# Patient Record
Sex: MALE | Race: WHITE | NOT HISPANIC OR LATINO | Employment: FULL TIME | URBAN - METROPOLITAN AREA
[De-identification: names, ages, dates, MRNs, and addresses within clinical notes are randomized per-mention and may not be internally consistent; named-entity substitution may affect disease eponyms.]

---

## 2020-05-02 ENCOUNTER — HOSPITAL ENCOUNTER (EMERGENCY)
Facility: MEDICAL CENTER | Age: 60
End: 2020-05-02
Attending: EMERGENCY MEDICINE

## 2020-05-02 ENCOUNTER — APPOINTMENT (OUTPATIENT)
Dept: RADIOLOGY | Facility: MEDICAL CENTER | Age: 60
End: 2020-05-02
Attending: EMERGENCY MEDICINE

## 2020-05-02 ENCOUNTER — OFFICE VISIT (OUTPATIENT)
Dept: URGENT CARE | Facility: PHYSICIAN GROUP | Age: 60
End: 2020-05-02

## 2020-05-02 VITALS
SYSTOLIC BLOOD PRESSURE: 168 MMHG | BODY MASS INDEX: 41.1 KG/M2 | DIASTOLIC BLOOD PRESSURE: 79 MMHG | HEIGHT: 66 IN | OXYGEN SATURATION: 95 % | TEMPERATURE: 97.4 F | HEART RATE: 79 BPM | RESPIRATION RATE: 20 BRPM | WEIGHT: 255.73 LBS

## 2020-05-02 VITALS
HEIGHT: 66 IN | OXYGEN SATURATION: 95 % | SYSTOLIC BLOOD PRESSURE: 180 MMHG | WEIGHT: 255.2 LBS | DIASTOLIC BLOOD PRESSURE: 100 MMHG | RESPIRATION RATE: 16 BRPM | BODY MASS INDEX: 41.01 KG/M2 | HEART RATE: 88 BPM | TEMPERATURE: 98.2 F

## 2020-05-02 DIAGNOSIS — R06.00 DYSPNEA, UNSPECIFIED TYPE: ICD-10-CM

## 2020-05-02 DIAGNOSIS — I10 HYPERTENSION, UNSPECIFIED TYPE: ICD-10-CM

## 2020-05-02 DIAGNOSIS — R06.03 ACUTE RESPIRATORY DISTRESS: ICD-10-CM

## 2020-05-02 LAB
ALBUMIN SERPL BCP-MCNC: 4.4 G/DL (ref 3.2–4.9)
ALBUMIN/GLOB SERPL: 1.6 G/DL
ALP SERPL-CCNC: 68 U/L (ref 30–99)
ALT SERPL-CCNC: 25 U/L (ref 2–50)
ANION GAP SERPL CALC-SCNC: 15 MMOL/L (ref 7–16)
AST SERPL-CCNC: 25 U/L (ref 12–45)
BASOPHILS # BLD AUTO: 0.6 % (ref 0–1.8)
BASOPHILS # BLD: 0.06 K/UL (ref 0–0.12)
BILIRUB SERPL-MCNC: 0.7 MG/DL (ref 0.1–1.5)
BUN SERPL-MCNC: 12 MG/DL (ref 8–22)
CALCIUM SERPL-MCNC: 9 MG/DL (ref 8.5–10.5)
CHLORIDE SERPL-SCNC: 97 MMOL/L (ref 96–112)
CO2 SERPL-SCNC: 23 MMOL/L (ref 20–33)
CREAT SERPL-MCNC: 0.67 MG/DL (ref 0.5–1.4)
EKG IMPRESSION: NORMAL
EOSINOPHIL # BLD AUTO: 0.09 K/UL (ref 0–0.51)
EOSINOPHIL NFR BLD: 0.9 % (ref 0–6.9)
ERYTHROCYTE [DISTWIDTH] IN BLOOD BY AUTOMATED COUNT: 49.1 FL (ref 35.9–50)
GLOBULIN SER CALC-MCNC: 2.8 G/DL (ref 1.9–3.5)
GLUCOSE SERPL-MCNC: 106 MG/DL (ref 65–99)
HCT VFR BLD AUTO: 45.7 % (ref 42–52)
HGB BLD-MCNC: 16.1 G/DL (ref 14–18)
IMM GRANULOCYTES # BLD AUTO: 0.03 K/UL (ref 0–0.11)
IMM GRANULOCYTES NFR BLD AUTO: 0.3 % (ref 0–0.9)
LYMPHOCYTES # BLD AUTO: 1.86 K/UL (ref 1–4.8)
LYMPHOCYTES NFR BLD: 19.5 % (ref 22–41)
MAGNESIUM SERPL-MCNC: 1.6 MG/DL (ref 1.5–2.5)
MCH RBC QN AUTO: 33.6 PG (ref 27–33)
MCHC RBC AUTO-ENTMCNC: 35.2 G/DL (ref 33.7–35.3)
MCV RBC AUTO: 95.4 FL (ref 81.4–97.8)
MONOCYTES # BLD AUTO: 0.77 K/UL (ref 0–0.85)
MONOCYTES NFR BLD AUTO: 8.1 % (ref 0–13.4)
NEUTROPHILS # BLD AUTO: 6.75 K/UL (ref 1.82–7.42)
NEUTROPHILS NFR BLD: 70.6 % (ref 44–72)
NRBC # BLD AUTO: 0 K/UL
NRBC BLD-RTO: 0 /100 WBC
NT-PROBNP SERPL IA-MCNC: 245 PG/ML (ref 0–125)
PLATELET # BLD AUTO: 251 K/UL (ref 164–446)
PMV BLD AUTO: 9.8 FL (ref 9–12.9)
POTASSIUM SERPL-SCNC: 3.9 MMOL/L (ref 3.6–5.5)
PROT SERPL-MCNC: 7.2 G/DL (ref 6–8.2)
RBC # BLD AUTO: 4.79 M/UL (ref 4.7–6.1)
SODIUM SERPL-SCNC: 135 MMOL/L (ref 135–145)
TROPONIN T SERPL-MCNC: 9 NG/L (ref 6–19)
WBC # BLD AUTO: 9.6 K/UL (ref 4.8–10.8)

## 2020-05-02 PROCEDURE — 99284 EMERGENCY DEPT VISIT MOD MDM: CPT

## 2020-05-02 PROCEDURE — 85025 COMPLETE CBC W/AUTO DIFF WBC: CPT

## 2020-05-02 PROCEDURE — 83880 ASSAY OF NATRIURETIC PEPTIDE: CPT

## 2020-05-02 PROCEDURE — A9270 NON-COVERED ITEM OR SERVICE: HCPCS | Performed by: EMERGENCY MEDICINE

## 2020-05-02 PROCEDURE — 700102 HCHG RX REV CODE 250 W/ 637 OVERRIDE(OP): Performed by: EMERGENCY MEDICINE

## 2020-05-02 PROCEDURE — 71045 X-RAY EXAM CHEST 1 VIEW: CPT

## 2020-05-02 PROCEDURE — 84484 ASSAY OF TROPONIN QUANT: CPT

## 2020-05-02 PROCEDURE — 93005 ELECTROCARDIOGRAM TRACING: CPT | Performed by: EMERGENCY MEDICINE

## 2020-05-02 PROCEDURE — 99204 OFFICE O/P NEW MOD 45 MIN: CPT | Performed by: PHYSICIAN ASSISTANT

## 2020-05-02 PROCEDURE — 83735 ASSAY OF MAGNESIUM: CPT

## 2020-05-02 PROCEDURE — 80053 COMPREHEN METABOLIC PANEL: CPT

## 2020-05-02 RX ORDER — HYDROCHLOROTHIAZIDE 25 MG/1
25 TABLET ORAL ONCE
Status: COMPLETED | OUTPATIENT
Start: 2020-05-02 | End: 2020-05-02

## 2020-05-02 RX ORDER — HYDROCHLOROTHIAZIDE 25 MG/1
25 TABLET ORAL DAILY
Qty: 30 TAB | Refills: 0 | Status: SHIPPED | OUTPATIENT
Start: 2020-05-02

## 2020-05-02 RX ADMIN — HYDROCHLOROTHIAZIDE 25 MG: 25 TABLET ORAL at 19:00

## 2020-05-02 SDOH — HEALTH STABILITY: MENTAL HEALTH: HOW OFTEN DO YOU HAVE A DRINK CONTAINING ALCOHOL?: 4 OR MORE TIMES A WEEK

## 2020-05-02 ASSESSMENT — ENCOUNTER SYMPTOMS
COUGH: 1
SHORTNESS OF BREATH: 1
VOMITING: 0
SPUTUM PRODUCTION: 0
WHEEZING: 0
HEADACHES: 0
CHILLS: 0
PALPITATIONS: 0
SHORTNESS OF BREATH: 1
CHILLS: 0
FEVER: 0
VOMITING: 0
ABDOMINAL PAIN: 0
FEVER: 0
NECK PAIN: 0
NAUSEA: 0
ABDOMINAL PAIN: 0
NAUSEA: 0
FLANK PAIN: 0
HEMOPTYSIS: 0
CLAUDICATION: 0
BLURRED VISION: 0

## 2020-05-02 NOTE — PROGRESS NOTES
"  Subjective:     Orville Rao  is a 59 y.o. male who presents for Blood Pressure Problem (high blood pressure, not feeling good, SOB,started this morning )      Patient comes clinic complaining of feeling \"abnormal\" today.  Feeling \"just off\".  Patient states he felt slightly fatigued and slightly short of breath.  He notes past medical history of COPD thought to be fairly normal for him.  He denies past medical history of hypertension or abnormalities of blood pressure but did check his blood pressure while at home today and found himself to be significantly hypertensive.  He denies chest pain or palpitations but states he feels as though his \"heart is working a little harder\".  He states he has been hit by some \"allergy feelings\" over the last 3 to 4 weeks with some congestion and mild dry coughing.  He denies fevers or chills.  He states he lives with an elderly family member and the rest of the household have been regularly checking temperatures for fever out of concern for COVID.  He denies past medical history of cardiac episodes.  Denies history of treatment for hypertension.  Denies any treatments tried today.      Review of Systems   Constitutional: Negative for chills and fever.   Respiratory: Positive for cough ( mild, dry, last few weeks) and shortness of breath ( not too much worse than ). Negative for hemoptysis, sputum production and wheezing.    Cardiovascular: Negative for chest pain, palpitations, claudication and leg swelling.        \"heart is working hard\"   Gastrointestinal: Negative for abdominal pain, nausea and vomiting.   Genitourinary: Negative for flank pain and hematuria.   Skin: Negative for rash.       Medications:    • This patient does not have an active medication from one of the medication groupers.    Allergies: Penicillins    Problem List: Orville Rao does not have a problem list on file.    Surgical History:  No past surgical history on file.    Past Social Hx: Orville Rao   " "    Past Family Hx:  Orville Rao family history is not on file.     Problem list, medications, and allergies reviewed by myself today in Epic.     Objective:   BP (!) 180/100 (BP Location: Right arm, Patient Position: Sitting, BP Cuff Size: Large adult)   Pulse 88   Temp 36.8 °C (98.2 °F) (Temporal)   Resp 16   Ht 1.676 m (5' 6\")   Wt 115.8 kg (255 lb 3.2 oz)   SpO2 95%   BMI 41.19 kg/m²     Physical Exam  Vitals signs and nursing note reviewed.   Constitutional:       General: He is not in acute distress.     Appearance: He is well-developed. He is diaphoretic.   HENT:      Head: Normocephalic and atraumatic.      Right Ear: External ear normal.      Left Ear: External ear normal.      Nose: Nose normal.      Mouth/Throat:      Lips: Pink.      Mouth: Mucous membranes are moist.   Eyes:      General: No scleral icterus.        Right eye: No discharge.         Left eye: No discharge.      Conjunctiva/sclera: Conjunctivae normal.   Neck:      Musculoskeletal: Neck supple.   Cardiovascular:      Rate and Rhythm: Regular rhythm. Tachycardia present.  No extrasystoles are present.  Pulmonary:      Effort: Pulmonary effort is normal. No accessory muscle usage or respiratory distress.      Breath sounds: No stridor. Wheezing ( trace scattered) present. No rhonchi.   Musculoskeletal: Normal range of motion.   Skin:     General: Skin is warm.      Coloration: Skin is not pale.   Neurological:      Mental Status: He is alert and oriented to person, place, and time.      Coordination: Coordination normal.     EKG in the office reveals a sinus tach rhythm with a rate of 85. There is no ectopy, no ST elevation, depression, tall T waves in V1 comparatively.    Assessment/Plan:   Assessment      1. Hypertension, unspecified type  - EKG    2. Acute respiratory distress  Patient has been directed to [facility] for further management/work up now today.  His father who is agreed to transport him to the ER " directly/immediately/now.  Pt is offered EMS transport to [facility] ED now and he does decline (after a lengthy discussion describing the necessity and potential risk), we discuss the risk of a possible worsening in health over drive to ED and he does express understanding and declines EMS transport.    I have worn an N95 mask, gloves and eye protection for the entire encounter with this patient.

## 2020-05-02 NOTE — ED TRIAGE NOTES
"Chief Complaint   Patient presents with   • Hypertension   • Shortness of Breath     Pt ambulated to triage, sent from urgent care. C/o \"not feeling good\", pt took her bp and was high. No history of hypertension, smokes 1pack and drinks alcohol daily. He had allergy symptoms couple days ago took claritin and resolved.   Pt said that he usually have sob but worst today, denies cough or fever.     "

## 2020-05-03 NOTE — ED PROVIDER NOTES
"ED Provider Note    Means of arrival: private vehicle  History obtained from: patient  History limited by: none    CHIEF COMPLAINT  Chief Complaint   Patient presents with   • Hypertension   • Shortness of Breath       HPI  Orville Rao is a 59 y.o. male who presents to the Emergency Department for feeling unwell.  Patient reports that he woke up this morning and he was \"not feeling quite right.\"  Patient describes feeling generally foggy and therefore he subsequently took his blood pressure and found that he was hypertensive with blood pressure in the 200s systolic.  He therefore came in for further evaluation.  Patient has no known underlying hypertension, he does not see a primary care physician.  He is a smoker with underlying COPD.  Has no known heart disease.  He denies chest pain, headache, visual disturbances and flank pain.    REVIEW OF SYSTEMS  Review of Systems   Constitutional: Negative for chills and fever.   Eyes: Negative for blurred vision.   Respiratory: Positive for shortness of breath.    Cardiovascular: Negative for chest pain.   Gastrointestinal: Negative for abdominal pain, nausea and vomiting.   Genitourinary: Negative for dysuria.   Musculoskeletal: Negative for neck pain.   Neurological: Negative for headaches.   All other systems reviewed and are negative.        PAST MEDICAL HISTORY   has a past medical history of Arthritis and Chronic obstructive pulmonary disease (HCC).    SURGICAL HISTORY  patient denies any surgical history    SOCIAL HISTORY  Social History     Tobacco Use   • Smoking status: Current Every Day Smoker     Packs/day: 1.00   • Smokeless tobacco: Never Used   Substance Use Topics   • Alcohol use: Yes     Frequency: 4 or more times a week   • Drug use: Never      Social History     Substance and Sexual Activity   Drug Use Never       FAMILY HISTORY  History reviewed. No pertinent family history.    CURRENT MEDICATIONS  Home Medications     Reviewed by Piedad Marie R.N. " "(Registered Nurse) on 05/02/20 at 1643  Med List Status: Complete   Medication Last Dose Status   Naproxen Sodium (ALEVE PO) 5/2/2020 Active                ALLERGIES  Allergies   Allergen Reactions   • Penicillins        PHYSICAL EXAM  VITAL SIGNS: BP (!) 191/112   Pulse 95   Temp 36.3 °C (97.4 °F) (Temporal)   Resp 16   Ht 1.676 m (5' 6\")   Wt 116 kg (255 lb 11.7 oz)   SpO2 98%   BMI 41.28 kg/m²   Vitals reviewed by myself.  Physical Exam   Constitutional: He is oriented to person, place, and time and well-developed, well-nourished, and in no distress. No distress.   HENT:   Head: Normocephalic.   Eyes: EOM are normal.   Neck: Normal range of motion.   Cardiovascular: Normal rate, regular rhythm and normal heart sounds. Exam reveals no gallop and no friction rub.   No murmur heard.  Pulmonary/Chest: Effort normal and breath sounds normal. No respiratory distress. He has no wheezes. He has no rales.   Abdominal: Soft. He exhibits no distension. There is no abdominal tenderness. There is no rebound and no guarding.   Musculoskeletal: Normal range of motion.   Neurological: He is alert and oriented to person, place, and time.         DIAGNOSTIC STUDIES /  LABS  Labs Reviewed   CBC WITH DIFFERENTIAL - Abnormal; Notable for the following components:       Result Value    MCH 33.6 (*)     Lymphocytes 19.50 (*)     All other components within normal limits   COMP METABOLIC PANEL - Abnormal; Notable for the following components:    Glucose 106 (*)     All other components within normal limits   PROBRAIN NATRIURETIC PEPTIDE, NT - Abnormal; Notable for the following components:    NT-proBNP 245 (*)     All other components within normal limits   TROPONIN   MAGNESIUM   ESTIMATED GFR       All labs reviewed by me.    EKG Interpretation:  Interpreted by myself    12 Lead EKG interpreted by me to show:  EKG at 1737: Normal sinus rhythm, heart rate 85, normal axis, intervals notable for prolonged QT of 538, otherwise " normal intervals, no acute ST-T segment changes, no evidence of acute arrhythmia or ischemia  My impression of this EKG: Does not indicate ischemia or arrhythmia at this time.    RADIOLOGY  DX-CHEST-PORTABLE (1 VIEW)   Final Result      No acute cardiopulmonary disease.        The radiologist's interpretation of all radiological studies have been reviewed by me.        COURSE & MEDICAL DECISION MAKING  Nursing notes, VS, PMSFHx reviewed in chart.    Patient is a 59-year-old male who presents for evaluation of feeling unwell.  Differential diagnosis includes essential hypertension, hypertensive urgency, acute coronary syndrome, electrolyte disturbance.  Diagnostic work-up includes labs, EKG and chest x-ray.  Patient also complaining of some slight shortness of breath which may be heart failure in the setting of his hypertension and I elected to obtain a BNP.    Patient's initial vitals notable for systolic blood pressure in the 200s.  Patient has no underlying disease and is otherwise fairly asymptomatic therefore elected to treat this with hydrochlorothiazide.  Patient's EKG returns and demonstrates no evidence of acute arrhythmia or ischemia.  Of note he does have a prolonged QT interval, however he has not had any syncopal episodes and therefore as it is asymptomatic this will just need to be followed up outpatient.  Patient's labs returned and are unremarkable.  Troponin is negative and renal function is normal making hypertensive urgency and acute coronary syndrome unlikely.  Upon reassessment patient is feeling greatly improved after receiving hydrochlorothiazide and his blood pressure is in the 160 systolic.  He reports that he feels back to his normal baseline.  Therefore I advised him that is likely essential hypertension that was causing his symptoms and I will start him on hydrochlorothiazide.  As he does not have a primary care physician I gave him the number of clinics ([facility] and [facility] ) and  advised him to establish care with a primary care physician.  A message was also left with the  to help expedite primary care follow-up.  Patient understands and is agreeable to this plan.  He is then given strict return precautions and discharged in stable condition.      FINAL IMPRESSION  1. Hypertension, unspecified type    2. Dyspnea, unspecified type